# Patient Record
Sex: MALE | Race: WHITE | NOT HISPANIC OR LATINO | URBAN - NONMETROPOLITAN AREA
[De-identification: names, ages, dates, MRNs, and addresses within clinical notes are randomized per-mention and may not be internally consistent; named-entity substitution may affect disease eponyms.]

---

## 2018-12-21 RX ORDER — ALBUTEROL SULFATE 90 UG/1
2 AEROSOL, METERED RESPIRATORY (INHALATION) EVERY 4 HOURS PRN
Qty: 3 INHALER | Refills: 3 | Status: SHIPPED | OUTPATIENT
Start: 2018-12-21

## 2018-12-21 RX ORDER — TIOTROPIUM BROMIDE 18 UG/1
CAPSULE ORAL; RESPIRATORY (INHALATION)
Qty: 90 CAPSULE | Refills: 3 | Status: SHIPPED | OUTPATIENT
Start: 2018-12-21 | End: 2020-01-30 | Stop reason: ALTCHOICE

## 2018-12-21 NOTE — TELEPHONE ENCOUNTER
Patient needs a refill on his spiriva.I was going to edit the refills, but it wants a signature. Not sure if it would be valid with my signature.

## 2019-10-31 PROBLEM — R13.12 OROPHARYNGEAL DYSPHAGIA: Status: ACTIVE | Noted: 2019-10-31

## 2019-10-31 PROBLEM — E11.59 TYPE 2 DIABETES MELLITUS WITH CIRCULATORY DISORDER, WITHOUT LONG-TERM CURRENT USE OF INSULIN: Status: ACTIVE | Noted: 2019-10-31

## 2019-10-31 PROBLEM — J98.4 RESTRICTIVE LUNG DISEASE: Status: ACTIVE | Noted: 2019-10-31

## 2019-10-31 PROBLEM — I10 ESSENTIAL HYPERTENSION: Status: ACTIVE | Noted: 2019-10-31

## 2019-10-31 PROBLEM — J43.2 CENTRILOBULAR EMPHYSEMA (HCC): Status: ACTIVE | Noted: 2019-10-31

## 2019-10-31 PROBLEM — G47.33 OBSTRUCTIVE SLEEP APNEA: Status: ACTIVE | Noted: 2019-10-31

## 2019-10-31 PROBLEM — Z87.891 PERSONAL HISTORY OF NICOTINE DEPENDENCE: Status: ACTIVE | Noted: 2019-10-31

## 2019-11-07 ENCOUNTER — OFFICE VISIT (OUTPATIENT)
Dept: PULMONOLOGY | Facility: CLINIC | Age: 59
End: 2019-11-07

## 2019-11-07 VITALS
WEIGHT: 196 LBS | HEART RATE: 84 BPM | DIASTOLIC BLOOD PRESSURE: 80 MMHG | SYSTOLIC BLOOD PRESSURE: 142 MMHG | OXYGEN SATURATION: 98 % | HEIGHT: 70 IN | BODY MASS INDEX: 28.06 KG/M2

## 2019-11-07 DIAGNOSIS — J43.2 CENTRILOBULAR EMPHYSEMA (HCC): ICD-10-CM

## 2019-11-07 DIAGNOSIS — J98.4 RESTRICTIVE LUNG DISEASE: Primary | ICD-10-CM

## 2019-11-07 DIAGNOSIS — Z87.891 PERSONAL HISTORY OF NICOTINE DEPENDENCE: ICD-10-CM

## 2019-11-07 PROCEDURE — 94010 BREATHING CAPACITY TEST: CPT | Performed by: NURSE PRACTITIONER

## 2019-11-07 PROCEDURE — 99214 OFFICE O/P EST MOD 30 MIN: CPT | Performed by: NURSE PRACTITIONER

## 2019-11-07 RX ORDER — CARVEDILOL 12.5 MG/1
TABLET ORAL
Refills: 3 | COMMUNITY
Start: 2019-08-26

## 2019-11-07 RX ORDER — LOSARTAN POTASSIUM AND HYDROCHLOROTHIAZIDE 12.5; 1 MG/1; MG/1
TABLET ORAL
Refills: 0 | COMMUNITY
Start: 2019-08-24

## 2019-11-07 RX ORDER — ALBUTEROL SULFATE 90 UG/1
2 AEROSOL, METERED RESPIRATORY (INHALATION) EVERY 4 HOURS PRN
Qty: 1 INHALER | Refills: 2 | Status: SHIPPED | OUTPATIENT
Start: 2019-11-07 | End: 2019-12-07

## 2019-11-07 RX ORDER — AMLODIPINE BESYLATE 5 MG/1
TABLET ORAL
Refills: 2 | COMMUNITY
Start: 2019-08-26

## 2019-11-07 NOTE — PATIENT INSTRUCTIONS

## 2019-11-07 NOTE — PROCEDURES
Pulmonary Function Test  Performed by: Negrita Hernandez APRN  Authorized by: Negrita Hernandez APRN      Pre Drug    FVC: 75%   FEV1: 73%   FEF 25-75%: 67%   FEV1/FVC: 77.61%

## 2020-01-24 DIAGNOSIS — J98.4 RESTRICTIVE LUNG DISEASE: ICD-10-CM

## 2020-01-24 NOTE — TELEPHONE ENCOUNTER
Patient changed insurance companies and needs a new script sent to [facility].  Refills approved and sent to pharmacy.

## 2020-01-30 ENCOUNTER — TELEPHONE (OUTPATIENT)
Dept: PULMONOLOGY | Facility: CLINIC | Age: 60
End: 2020-01-30

## 2020-01-30 NOTE — TELEPHONE ENCOUNTER
Received the denial letter for the Spiriva.  Patient is to use incruse first or if the patient has a low inspiratory flow rate and can't use a dry powder.  Does this patient have asthma?  Do you want to switch him to incruse?

## 2021-01-12 PROBLEM — E11.59 TYPE 2 DIABETES MELLITUS WITH CIRCULATORY DISORDER, WITHOUT LONG-TERM CURRENT USE OF INSULIN: Chronic | Status: ACTIVE | Noted: 2019-10-31

## 2021-01-12 PROBLEM — G47.33 OBSTRUCTIVE SLEEP APNEA: Chronic | Status: ACTIVE | Noted: 2019-10-31

## 2021-01-12 PROBLEM — Z87.891 PERSONAL HISTORY OF NICOTINE DEPENDENCE: Chronic | Status: ACTIVE | Noted: 2019-10-31

## 2021-01-12 PROBLEM — J43.2 CENTRILOBULAR EMPHYSEMA (HCC): Chronic | Status: ACTIVE | Noted: 2019-10-31

## 2021-01-12 PROBLEM — J98.4 RESTRICTIVE LUNG DISEASE: Chronic | Status: ACTIVE | Noted: 2019-10-31

## 2021-01-12 PROBLEM — I10 ESSENTIAL HYPERTENSION: Chronic | Status: ACTIVE | Noted: 2019-10-31

## 2021-01-12 NOTE — PROGRESS NOTES
"Chief Complaint  restrictive lung disease and Sleep Apnea    Subjective    History of Present Illness      Prosper Hussein presents to [facility] for:    Management of her restrictive lung disease and emphysema.  He gets short of breath.  He takes Spiriva which helps.  He has Ventolin he can use when needed although he does not typically require it.  He reports he was diagnosed with Covid 4 weeks ago.  He presented to his primary care office weak, dizzy, hypoxic and generally ill.  He was sent to the emergency room for Regeneron infusion.  Within 24 hours his symptoms were essentially resolved.  He has had 2 utilize his Ventolin some since the diagnosis but not excessive.  He is not used it in the last several days.  He has not been requiring the Tessalon Perles.  He is overweight.  He used to smoke but no longer does.        Objective   Vital Signs:   /80   Pulse 85   Temp 98.2 °F (36.8 °C)   Ht 177.8 cm (70\")   Wt 88.2 kg (194 lb 6.4 oz)   SpO2 100%   BMI 27.89 kg/m²     Physical Exam  Constitutional:       Appearance: He is overweight.      Interventions: Face mask in place.   Eyes:      Comments: glasses   Cardiovascular:      Rate and Rhythm: Normal rate and regular rhythm.      Heart sounds: No murmur.   Pulmonary:      Effort: Pulmonary effort is normal.      Breath sounds: Normal breath sounds.   Musculoskeletal:      Right lower leg: No edema.      Left lower leg: No edema.   Neurological:      Mental Status: He is alert and oriented to person, place, and time.        Result Review :{ Labs  Result Review  Imaging  Med Tab  Media     PFT Values        Some values may be hidden. Unless noted otherwise, only the newest values recorded on each date are displayed.         Old Values PFT Results 11/7/19   No data to display.      Pre Drug PFT Results 11/7/19   FVC 75   FEV1 73   FEF 25-75% 67   FEV1/FVC 77.61      Post Drug PFT Results 11/7/19   No data to " display.      Other Tests PFT Results 11/7/19   No data to display.           My interpretation of the PFT: None for this visit    Results for orders placed in visit on 11/07/19   Pulmonary Function Test    Narrative Gui Segundo, MA     11/7/2019  3:48 PM  Pulmonary Function Test  Performed by: Negrita Hernandez APRN  Authorized by: Negrita Hernandez APRN      Pre Drug    FVC: 75%   FEV1: 73%   FEF 25-75%: 67%   FEV1/FVC: 77.61%       My interpretation of imaging:  None for today    My interpretation of labs: none for today      Assessment and Plan      Problem List Items Addressed This Visit        Other    Restrictive lung disease - Primary (Chronic)    Relevant Medications    Spiriva Respimat 2.5 MCG/ACT aerosol solution inhaler    benzonatate (TESSALON) 200 MG capsule    BMI 28.0-28.9,adult (Chronic)    Personal history of nicotine dependence (Chronic)    Centrilobular emphysema (CMS/HCC) (Chronic)    Relevant Medications    Spiriva Respimat 2.5 MCG/ACT aerosol solution inhaler    benzonatate (TESSALON) 200 MG capsule      Other Visit Diagnoses     COVID-19 virus detected              Patient's Body mass index is 27.89 kg/m². BMI is above normal parameters. Recommendations include: educational material.      He typically sees us on an annual basis however he was recently diagnosed with Covid.  Symptoms improved significantly with Regeneron infusion.  He continues to have occasional cough, fatigue and shortness of breath.  Recommend he return in a few months with updated PFTs for continued surveillance.  I will order these.  He continues to do well with the Spiriva and will keep taking it.  He has used his Ventolin few times since his Covid diagnosis and tolerated well.  He can continue to use that on an as-needed basis for his shortness of breath and cough.  He also has Tessalon Perles he can use for the cough.  He continues to not smoke.  He is overweight.  Educational material provided in his after visit  summary.  He is aware he can reach out if symptoms are worsening or not improving otherwise we will see him back with updated PFTs in 6 months.    Negrita Hernandez, APRN  1/18/2021  09:48 CST    Follow Up   Return in about 6 months (around 7/18/2021) for FVL.    Patient was given instructions and counseling regarding his condition or for health maintenance advice. Please see specific information pulled into the AVS if appropriate.

## 2021-01-18 ENCOUNTER — OFFICE VISIT (OUTPATIENT)
Dept: PULMONOLOGY | Facility: CLINIC | Age: 61
End: 2021-01-18

## 2021-01-18 VITALS
DIASTOLIC BLOOD PRESSURE: 80 MMHG | BODY MASS INDEX: 27.83 KG/M2 | HEART RATE: 85 BPM | TEMPERATURE: 98.2 F | WEIGHT: 194.4 LBS | HEIGHT: 70 IN | SYSTOLIC BLOOD PRESSURE: 126 MMHG | OXYGEN SATURATION: 100 %

## 2021-01-18 DIAGNOSIS — U07.1 COVID-19 VIRUS DETECTED: ICD-10-CM

## 2021-01-18 DIAGNOSIS — J98.4 RESTRICTIVE LUNG DISEASE: Primary | Chronic | ICD-10-CM

## 2021-01-18 DIAGNOSIS — J43.2 CENTRILOBULAR EMPHYSEMA (HCC): Chronic | ICD-10-CM

## 2021-01-18 DIAGNOSIS — Z87.891 PERSONAL HISTORY OF NICOTINE DEPENDENCE: Chronic | ICD-10-CM

## 2021-01-18 PROCEDURE — 99214 OFFICE O/P EST MOD 30 MIN: CPT | Performed by: NURSE PRACTITIONER

## 2021-01-18 RX ORDER — TIOTROPIUM BROMIDE INHALATION SPRAY 3.12 UG/1
1 SPRAY, METERED RESPIRATORY (INHALATION) 2 TIMES DAILY
COMMUNITY
Start: 2020-10-14 | End: 2021-02-26 | Stop reason: SDUPTHER

## 2021-01-18 RX ORDER — BENZONATATE 200 MG/1
200 CAPSULE ORAL EVERY 12 HOURS
COMMUNITY
Start: 2020-11-25 | End: 2021-12-16 | Stop reason: SDUPTHER

## 2021-01-18 RX ORDER — ASPIRIN 81 MG/1
81 TABLET, DELAYED RELEASE ORAL DAILY
COMMUNITY

## 2021-01-18 NOTE — PATIENT INSTRUCTIONS
"BMI for Adults  What is BMI?  Body mass index (BMI) is a number that is calculated from a person's weight and height. BMI can help estimate how much of a person's weight is composed of fat. BMI does not measure body fat directly. Rather, it is an alternative to procedures that directly measure body fat, which can be difficult and expensive.  BMI can help identify people who may be at higher risk for certain medical problems.  What are BMI measurements used for?  BMI is used as a screening tool to identify possible weight problems. It helps determine whether a person is obese, overweight, a healthy weight, or underweight.  BMI is useful for:  · Identifying a weight problem that may be related to a medical condition or may increase the risk for medical problems.  · Promoting changes, such as changes in diet and exercise, to help reach a healthy weight. BMI screening can be repeated to see if these changes are working.  How is BMI calculated?  BMI involves measuring your weight in relation to your height. Both height and weight are measured, and the BMI is calculated from those numbers. This can be done either in English (U.S[place] or metric measurements. Note that charts and online BMI calculators are available to help you find your BMI quickly and easily without having to do these calculations yourself.  To calculate your BMI in English (U.S[place] measurements:    1. Measure your weight in pounds (lb).  2. Multiply the number of pounds by 703.  ? For example, for a person who weighs 180 lb, multiply that number by 703, which equals 126,540.  3. Measure your height in inches. Then multiply that number by itself to get a measurement called \"inches squared.\"  ? For example, for a person who is 70 inches tall, the \"inches squared\" measurement is 70 inches x 70 inches, which equals 4,900 inches squared.  4. Divide the total from step 2 (number of lb x 703) by the total from step 3 (inches squared): 126,540 ÷ 4,900 = 25.8. This is " "your BMI.  To calculate your BMI in metric measurements:  1. Measure your weight in kilograms (kg).  2. Measure your height in meters (m). Then multiply that number by itself to get a measurement called \"meters squared.\"  ? For example, for a person who is 1.75 m tall, the \"meters squared\" measurement is 1.75 m x 1.75 m, which is equal to 3.1 meters squared.  3. Divide the number of kilograms (your weight) by the meters squared number. In this example: 70 ÷ 3.1 = 22.6. This is your BMI.  What do the results mean?  BMI charts are used to identify whether you are underweight, normal weight, overweight, or obese. The following guidelines will be used:  · Underweight: BMI less than 18.5.  · Normal weight: BMI between 18.5 and 24.9.  · Overweight: BMI between 25 and 29.9.  · Obese: BMI of 30 or above.  Keep these notes in mind:  · Weight includes both fat and muscle, so someone with a muscular build, such as an athlete, may have a BMI that is higher than 24.9. In cases like these, BMI is not an accurate measure of body fat.  · To determine if excess body fat is the cause of a BMI of 25 or higher, further assessments may need to be done by a health care provider.  · BMI is usually interpreted in the same way for men and women.  Where to find more information  For more information about BMI, including tools to quickly calculate your BMI, go to these websites:  · Centers for Disease Control and Prevention: www.cdc.gov  · American Heart Association: www.heart.org  · National Heart, Lung, and Blood [place]: www.nhlbi.nih.gov  Summary  · Body mass index (BMI) is a number that is calculated from a person's weight and height.  · BMI may help estimate how much of a person's weight is composed of fat. BMI can help identify those who may be at higher risk for certain medical problems.  · BMI can be measured using English measurements or metric measurements.  · BMI charts are used to identify whether you are underweight, normal " weight, overweight, or obese.  This information is not intended to replace advice given to you by your health care provider. Make sure you discuss any questions you have with your health care provider.  Document Revised: 09/09/2020 Document Reviewed: 07/17/2020  Elsevier Patient Education © 2020 Elsevier Inc.

## 2021-02-26 RX ORDER — TIOTROPIUM BROMIDE INHALATION SPRAY 3.12 UG/1
2 SPRAY, METERED RESPIRATORY (INHALATION)
Qty: 1 EACH | Refills: 11 | Status: SHIPPED | OUTPATIENT
Start: 2021-02-26 | End: 2021-11-02

## 2021-02-26 NOTE — TELEPHONE ENCOUNTER
Patient called stating that he doesn't have any Spiriva at [facility] [place]

## 2021-07-06 PROBLEM — J92.9 PLEURAL THICKENING: Status: ACTIVE | Noted: 2021-07-06

## 2021-07-06 PROBLEM — J92.9 PLEURAL THICKENING: Chronic | Status: ACTIVE | Noted: 2021-07-06

## 2021-10-30 ENCOUNTER — LAB (OUTPATIENT)
Dept: LAB | Facility: HOSPITAL | Age: 61
End: 2021-10-30

## 2021-10-30 DIAGNOSIS — Z01.812 ENCOUNTER FOR PREOPERATIVE SCREENING LABORATORY TESTING FOR COVID-19 VIRUS: ICD-10-CM

## 2021-10-30 DIAGNOSIS — Z20.822 ENCOUNTER FOR PREOPERATIVE SCREENING LABORATORY TESTING FOR COVID-19 VIRUS: ICD-10-CM

## 2021-10-30 LAB — SARS-COV-2 ORF1AB RESP QL NAA+PROBE: NOT DETECTED

## 2021-10-30 PROCEDURE — C9803 HOPD COVID-19 SPEC COLLECT: HCPCS

## 2021-10-30 PROCEDURE — U0004 COV-19 TEST NON-CDC HGH THRU: HCPCS

## 2021-11-02 ENCOUNTER — OFFICE VISIT (OUTPATIENT)
Dept: PULMONOLOGY | Facility: CLINIC | Age: 61
End: 2021-11-02

## 2021-11-02 VITALS
WEIGHT: 196.8 LBS | SYSTOLIC BLOOD PRESSURE: 140 MMHG | OXYGEN SATURATION: 98 % | HEIGHT: 70 IN | DIASTOLIC BLOOD PRESSURE: 86 MMHG | HEART RATE: 82 BPM | BODY MASS INDEX: 28.18 KG/M2

## 2021-11-02 DIAGNOSIS — J43.2 CENTRILOBULAR EMPHYSEMA (HCC): Primary | Chronic | ICD-10-CM

## 2021-11-02 DIAGNOSIS — Z20.822 ENCOUNTER FOR PREOPERATIVE SCREENING LABORATORY TESTING FOR COVID-19 VIRUS: ICD-10-CM

## 2021-11-02 DIAGNOSIS — Z23 NEED FOR INFLUENZA VACCINATION: ICD-10-CM

## 2021-11-02 DIAGNOSIS — Z01.812 ENCOUNTER FOR PREOPERATIVE SCREENING LABORATORY TESTING FOR COVID-19 VIRUS: ICD-10-CM

## 2021-11-02 DIAGNOSIS — J92.9 PLEURAL THICKENING: Chronic | ICD-10-CM

## 2021-11-02 DIAGNOSIS — J98.4 RESTRICTIVE LUNG DISEASE: Chronic | ICD-10-CM

## 2021-11-02 DIAGNOSIS — Z87.891 PERSONAL HISTORY OF NICOTINE DEPENDENCE: Chronic | ICD-10-CM

## 2021-11-02 PROCEDURE — 94010 BREATHING CAPACITY TEST: CPT | Performed by: NURSE PRACTITIONER

## 2021-11-02 PROCEDURE — 90686 IIV4 VACC NO PRSV 0.5 ML IM: CPT | Performed by: NURSE PRACTITIONER

## 2021-11-02 PROCEDURE — 90471 IMMUNIZATION ADMIN: CPT | Performed by: NURSE PRACTITIONER

## 2021-11-02 PROCEDURE — 99214 OFFICE O/P EST MOD 30 MIN: CPT | Performed by: NURSE PRACTITIONER

## 2021-11-02 RX ORDER — RESVER/WINE/BFL/GRPSD/PC/C/POM 200MG-60MG
1 CAPSULE ORAL DAILY
COMMUNITY
Start: 2021-08-17

## 2021-11-02 NOTE — PROCEDURES
Pulmonary Function Test  Performed by: Ayla Orr, RRT  Authorized by: Negrita Hernandez APRN      Pre Drug % Predicted    FVC: 75%   FEV1: 69%   FEF 25-75%: 48%   FEV1/FVC: 73.59%    Interpretation   Spirometry   Spirometry shows moderate restriction. There is reduced midflow suggesting small airway/airflow obstruction.

## 2021-11-02 NOTE — PATIENT INSTRUCTIONS
"BMI for Adults  What is BMI?  Body mass index (BMI) is a number that is calculated from a person's weight and height. BMI can help estimate how much of a person's weight is composed of fat. BMI does not measure body fat directly. Rather, it is an alternative to procedures that directly measure body fat, which can be difficult and expensive.  BMI can help identify people who may be at higher risk for certain medical problems.  What are BMI measurements used for?  BMI is used as a screening tool to identify possible weight problems. It helps determine whether a person is obese, overweight, a healthy weight, or underweight.  BMI is useful for:  · Identifying a weight problem that may be related to a medical condition or may increase the risk for medical problems.  · Promoting changes, such as changes in diet and exercise, to help reach a healthy weight. BMI screening can be repeated to see if these changes are working.  How is BMI calculated?  BMI involves measuring your weight in relation to your height. Both height and weight are measured, and the BMI is calculated from those numbers. This can be done either in English (U.S[place] or metric measurements. Note that charts and online BMI calculators are available to help you find your BMI quickly and easily without having to do these calculations yourself.  To calculate your BMI in English (U.S.) measurements:    1. Measure your weight in pounds (lb).  2. Multiply the number of pounds by 703.  ? For example, for a person who weighs 180 lb, multiply that number by 703, which equals 126,540.  3. Measure your height in inches. Then multiply that number by itself to get a measurement called \"inches squared.\"  ? For example, for a person who is 70 inches tall, the \"inches squared\" measurement is 70 inches x 70 inches, which equals 4,900 inches squared.  4. Divide the total from step 2 (number of lb x 703) by the total from step 3 (inches squared): 126,540 ÷ 4,900 = 25.8. This is " "your BMI.    To calculate your BMI in metric measurements:  1. Measure your weight in kilograms (kg).  2. Measure your height in meters (m). Then multiply that number by itself to get a measurement called \"meters squared.\"  ? For example, for a person who is 1.75 m tall, the \"meters squared\" measurement is 1.75 m x 1.75 m, which is equal to 3.1 meters squared.  3. Divide the number of kilograms (your weight) by the meters squared number. In this example: 70 ÷ 3.1 = 22.6. This is your BMI.  What do the results mean?  BMI charts are used to identify whether you are underweight, normal weight, overweight, or obese. The following guidelines will be used:  · Underweight: BMI less than 18.5.  · Normal weight: BMI between 18.5 and 24.9.  · Overweight: BMI between 25 and 29.9.  · Obese: BMI of 30 or above.  Keep these notes in mind:  · Weight includes both fat and muscle, so someone with a muscular build, such as an athlete, may have a BMI that is higher than 24.9. In cases like these, BMI is not an accurate measure of body fat.  · To determine if excess body fat is the cause of a BMI of 25 or higher, further assessments may need to be done by a health care provider.  · BMI is usually interpreted in the same way for men and women.  Where to find more information  For more information about BMI, including tools to quickly calculate your BMI, go to these websites:  · Centers for Disease Control and Prevention: www.cdc.gov  · American Heart Association: www.heart.org  · National Heart, Lung, and Blood [place]: www.nhlbi.nih.gov  Summary  · Body mass index (BMI) is a number that is calculated from a person's weight and height.  · BMI may help estimate how much of a person's weight is composed of fat. BMI can help identify those who may be at higher risk for certain medical problems.  · BMI can be measured using English measurements or metric measurements.  · BMI charts are used to identify whether you are underweight, normal " weight, overweight, or obese.  This information is not intended to replace advice given to you by your health care provider. Make sure you discuss any questions you have with your health care provider.  Document Revised: 09/09/2020 Document Reviewed: 07/17/2020  Elsevier Patient Education © 2021 Elsevier Inc.

## 2021-12-16 DIAGNOSIS — J43.2 CENTRILOBULAR EMPHYSEMA (HCC): Primary | ICD-10-CM

## 2021-12-16 RX ORDER — BENZONATATE 200 MG/1
200 CAPSULE ORAL EVERY 12 HOURS
Qty: 60 CAPSULE | Refills: 3 | Status: SHIPPED | OUTPATIENT
Start: 2021-12-16 | End: 2022-10-13 | Stop reason: SDUPTHER

## 2021-12-16 NOTE — TELEPHONE ENCOUNTER
Patient left a voicemail requesting refills on Tessalon perles. He also said the Incruse is not any cheaper.  I have tried to call him this morning to get more information on the Incruse and he does not have voicemail. I will continue trying to reach him.     Rx Refill Note  Requested Prescriptions     Pending Prescriptions Disp Refills   • benzonatate (TESSALON) 200 MG capsule       Sig: Take 1 capsule by mouth Every 12 (Twelve) Hours.      Last office visit with prescribing clinician: 11/2/2021      Next office visit with prescribing clinician: 5/5/2022            Gui Segundo CMA  12/16/21, 09:09 CST

## 2022-05-05 ENCOUNTER — OFFICE VISIT (OUTPATIENT)
Dept: PULMONOLOGY | Facility: CLINIC | Age: 62
End: 2022-05-05

## 2022-05-05 VITALS
DIASTOLIC BLOOD PRESSURE: 72 MMHG | HEIGHT: 70 IN | HEART RATE: 84 BPM | SYSTOLIC BLOOD PRESSURE: 140 MMHG | BODY MASS INDEX: 28.92 KG/M2 | OXYGEN SATURATION: 97 % | WEIGHT: 202 LBS

## 2022-05-05 DIAGNOSIS — J92.9 PLEURAL THICKENING: Chronic | ICD-10-CM

## 2022-05-05 DIAGNOSIS — J43.2 CENTRILOBULAR EMPHYSEMA: Chronic | ICD-10-CM

## 2022-05-05 DIAGNOSIS — J98.4 RESTRICTIVE LUNG DISEASE: Primary | Chronic | ICD-10-CM

## 2022-05-05 DIAGNOSIS — Z87.891 PERSONAL HISTORY OF NICOTINE DEPENDENCE: Chronic | ICD-10-CM

## 2022-05-05 PROCEDURE — 99213 OFFICE O/P EST LOW 20 MIN: CPT | Performed by: NURSE PRACTITIONER

## 2022-05-05 RX ORDER — TIOTROPIUM BROMIDE INHALATION SPRAY 3.12 UG/1
2 SPRAY, METERED RESPIRATORY (INHALATION)
Qty: 3 EACH | Refills: 3 | Status: SHIPPED | OUTPATIENT
Start: 2022-05-05 | End: 2022-11-14

## 2022-10-13 DIAGNOSIS — J43.2 CENTRILOBULAR EMPHYSEMA: ICD-10-CM

## 2022-10-13 RX ORDER — BENZONATATE 200 MG/1
200 CAPSULE ORAL EVERY 12 HOURS
Qty: 60 CAPSULE | Refills: 3 | Status: SHIPPED | OUTPATIENT
Start: 2022-10-13

## 2022-10-13 NOTE — TELEPHONE ENCOUNTER
Rx Refill Note  Requested Prescriptions     Pending Prescriptions Disp Refills   • benzonatate (TESSALON) 200 MG capsule 60 capsule 3     Sig: Take 1 capsule by mouth Every 12 (Twelve) Hours.      Last office visit with prescribing clinician: 5/5/2022      Next office visit with prescribing clinician: 11/14/2022            Vivian Monique MA  10/13/22, 10:46 CDT

## 2022-11-14 ENCOUNTER — PROCEDURE VISIT (OUTPATIENT)
Dept: PULMONOLOGY | Facility: CLINIC | Age: 62
End: 2022-11-14

## 2022-11-14 ENCOUNTER — OFFICE VISIT (OUTPATIENT)
Dept: PULMONOLOGY | Facility: CLINIC | Age: 62
End: 2022-11-14

## 2022-11-14 VITALS
BODY MASS INDEX: 29.69 KG/M2 | HEIGHT: 70 IN | WEIGHT: 207.4 LBS | OXYGEN SATURATION: 96 % | SYSTOLIC BLOOD PRESSURE: 146 MMHG | DIASTOLIC BLOOD PRESSURE: 82 MMHG | HEART RATE: 93 BPM

## 2022-11-14 DIAGNOSIS — J43.2 CENTRILOBULAR EMPHYSEMA: Primary | ICD-10-CM

## 2022-11-14 DIAGNOSIS — J98.4 RESTRICTIVE LUNG DISEASE: Chronic | ICD-10-CM

## 2022-11-14 DIAGNOSIS — J92.9 PLEURAL THICKENING: Primary | Chronic | ICD-10-CM

## 2022-11-14 DIAGNOSIS — J43.2 CENTRILOBULAR EMPHYSEMA: Chronic | ICD-10-CM

## 2022-11-14 DIAGNOSIS — G47.33 OBSTRUCTIVE SLEEP APNEA: Chronic | ICD-10-CM

## 2022-11-14 DIAGNOSIS — Z87.891 PERSONAL HISTORY OF NICOTINE DEPENDENCE: Chronic | ICD-10-CM

## 2022-11-14 PROCEDURE — 94010 BREATHING CAPACITY TEST: CPT | Performed by: NURSE PRACTITIONER

## 2022-11-14 PROCEDURE — 94729 DIFFUSING CAPACITY: CPT | Performed by: NURSE PRACTITIONER

## 2022-11-14 PROCEDURE — 99214 OFFICE O/P EST MOD 30 MIN: CPT | Performed by: NURSE PRACTITIONER

## 2022-11-14 NOTE — PROCEDURES
Pulmonary Function Test  Performed by: Ayla Orr, RRT  Authorized by: Negrita Hernandez APRN      Pre Drug % Predicted    FVC: 66%   FEV1: 66%   FEF 25-75%: 63%   FEV1/FVC: 76%   DLCO: 77%   D/VAsb: 110%    Interpretation   Spirometry   Spirometry shows moderate restriction. There is reduced midflow suggesting small airway/airflow obstruction.   Diffusion Capacity  The patient's diffusion capacity is mildly reduced.  Diffusion capacity is normal when corrected for alveolar volume.

## 2023-04-26 NOTE — PROGRESS NOTES
Chief Complaint  Pleural thickening    Subjective    History of Present Illness     Prosper Hussein presents to [facility] for:    History of Present Illness  Management of her restrictive lung disease, pleural thickening and emphysema.  He is overweight.  He quit smoking in 1999.  He gets short of breath.  He takes Spiriva which helps.  He is needing refills.  He has Ventolin he can use when needed although he does not typically require it.  He does not need refills.  He takes tessalon perles as needed for cough.  He has not required them since his last office visit.  He has sleep apnea.  He is on Pap therapy.  He is compliant with this and benefiting from it.  He is awaiting an appoint with cardiology for his blood pressure and an abnormal CT coronary scan.     Prior to Admission medications    Medication Sig Start Date End Date Taking? Authorizing Provider   albuterol sulfate HFA (VENTOLIN HFA) 108 (90 Base) MCG/ACT inhaler Inhale 2 puffs Every 4 (Four) Hours As Needed for Wheezing. 12/21/18   Negrita Hernandez APRN   amLODIPine (NORVASC) 5 MG tablet TK 1 T PO ONCE A DAY 8/26/19   Roslyn Arnold MD   aspirin 81 MG EC tablet Take 81 mg by mouth Daily.    ProviderRoslyn MD   benzonatate (TESSALON) 200 MG capsule Take 1 capsule by mouth Every 12 (Twelve) Hours. 10/13/22   Negrita Hernandez APRN   carvedilol (COREG) 12.5 MG tablet TK 1 T PO QAM AND 2 TS QPM 8/26/19   Roslyn Arnold MD   D-5000 125 MCG (5000 UT) tablet Take 1 tablet by mouth Daily. 8/17/21   Roslyn Arnold MD   losartan-hydrochlorothiazide (HYZAAR) 100-12.5 MG per tablet TK 1 T PO ONCE A DAY GEF HYZAAR 8/24/19   Roslyn Arnold MD   metFORMIN (GLUCOPHAGE) 500 MG tablet TK 1 T PO  BID WC 8/26/19   Roslyn Arnold MD   Spiriva Respimat 2.5 MCG/ACT aerosol solution inhaler Inhale 2 puffs Daily for 30 days. 2/26/21 11/2/21  Negrita Hernandez APRN   tiotropium bromide  "monohydrate (Spiriva Respimat) 2.5 MCG/ACT aerosol solution inhaler Inhale 2 puffs Daily for 90 days. 22  Negrita Hernandez APRN       Social History     Socioeconomic History    Marital status:    Tobacco Use    Smoking status: Former     Packs/day: 1.00     Years: 20.00     Pack years: 20.00     Types: Cigarettes     Quit date:      Years since quittin.4     Passive exposure: Past    Smokeless tobacco: Never   Substance and Sexual Activity    Alcohol use: Yes    Drug use: No    Sexual activity: Defer       Objective   Vital Signs:   /82   Pulse 71   Ht 177.8 cm (70\")   Wt 88.9 kg (196 lb)   SpO2 97% Comment: RA  BMI 28.12 kg/m²     Physical Exam  Eyes:      Comments: Glasses    Cardiovascular:      Rate and Rhythm: Normal rate and regular rhythm.      Heart sounds: No murmur heard.  Pulmonary:      Effort: Pulmonary effort is normal.      Breath sounds: Normal breath sounds.   Musculoskeletal:      Right lower leg: No edema.      Left lower leg: No edema.   Neurological:      Mental Status: He is alert and oriented to person, place, and time.      Result Review :    PFT Values          2021    15:45 2022    09:15   Pre Drug PFT Results   FVC 75 66   FEV1 69 66   FEF 25-75% 48 63   FEV1/FVC 73.59 76   Other Tests PFT Results   DLCO  77   D/VAsb  110     My interpretation of the PFT: none    Results for orders placed in visit on 22    Pulmonary Function Test    Narrative  Pulmonary Function Test  Performed by: Ayla Orr, RRT  Authorized by: Negrita Hernandez APRN    Pre Drug % Predicted  FVC: 66%  FEV1: 66%  FEF 25-75%: 63%  FEV1/FVC: 76%  DLCO: 77%  D/VAsb: 110%    Interpretation  Spirometry  Spirometry shows moderate restriction. There is reduced midflow suggesting small airway/airflow obstruction.  Diffusion Capacity  The patient's diffusion capacity is mildly reduced.  Diffusion capacity is normal when corrected for alveolar " volume.      Results for orders placed in visit on 11/02/21    Pulmonary Function Test    Narrative  Pulmonary Function Test  Performed by: Ayla Orr RRT  Authorized by: Negrita Hernandez APRN    Pre Drug % Predicted  FVC: 75%  FEV1: 69%  FEF 25-75%: 48%  FEV1/FVC: 73.59%    Interpretation  Spirometry  Spirometry shows moderate restriction. There is reduced midflow suggesting small airway/airflow obstruction.      Results for orders placed in visit on 11/07/19    Pulmonary Function Test    Narrative  Pulmonary Function Test  Performed by: Negrita Hernandez APRN  Authorized by: Negrita Hernandez APRN    Pre Drug  FVC: 75%  FEV1: 73%  FEF 25-75%: 67%  FEV1/FVC: 77.61%    My interpretation of imaging:  none    My interpretation of labs: none      Assessment and Plan     Diagnoses and all orders for this visit:    1. Restrictive lung disease (Primary)  Comments:  Continue to avoid smoking.  PFTs with follow-up.  Continue Spiriva.  Refill sent to pharmacy.  Use Ventolin as needed    2. Pleural thickening  Comments:  PFTs with follow-up    3. Obstructive sleep apnea  Comments:  Continue PAP therapy.  He is compliant with this and benefiting from it    4. Personal history of nicotine dependence  Comments:  Does not meet criteria for low-dose lung cancer screening    5. Encounter for immunization  Comments:  Preventative health maintenance provided.  Pneumonia shot given.  No unwanted side effects noted while monitoring him  Orders:  -     Pneumococcal Conjugate Vaccine 20-Valent (PCV20)    6. Centrilobular emphysema  Comments:  Continue to avoid smoking.  PFTs with follow-up.  Continue Spiriva.  Refill sent to pharmacy.  Use Ventolin as needed  Orders:  -     tiotropium bromide monohydrate (Spiriva Respimat) 2.5 MCG/ACT aerosol solution inhaler; Inhale 2 puffs Daily for 90 days.  Dispense: 3 each; Refill: 3        Body mass index is 28.12 kg/m².      Charting time spent: 4  Bedside time spent: Allegra RANDOLPH  KAROL Hernandez  5/24/2023  11:17 CDT    Follow Up   Return in about 6 months (around 11/24/2023) for FVL.    Patient was given instructions and counseling regarding his condition or for health maintenance advice. Please see specific information pulled into the AVS if appropriate.

## 2023-05-24 ENCOUNTER — OFFICE VISIT (OUTPATIENT)
Dept: PULMONOLOGY | Facility: CLINIC | Age: 63
End: 2023-05-24
Payer: COMMERCIAL

## 2023-05-24 VITALS
SYSTOLIC BLOOD PRESSURE: 136 MMHG | WEIGHT: 196 LBS | DIASTOLIC BLOOD PRESSURE: 82 MMHG | HEIGHT: 70 IN | BODY MASS INDEX: 28.06 KG/M2 | OXYGEN SATURATION: 97 % | HEART RATE: 71 BPM

## 2023-05-24 DIAGNOSIS — J92.9 PLEURAL THICKENING: Chronic | ICD-10-CM

## 2023-05-24 DIAGNOSIS — G47.33 OBSTRUCTIVE SLEEP APNEA: Chronic | ICD-10-CM

## 2023-05-24 DIAGNOSIS — J98.4 RESTRICTIVE LUNG DISEASE: Primary | Chronic | ICD-10-CM

## 2023-05-24 DIAGNOSIS — Z23 ENCOUNTER FOR IMMUNIZATION: ICD-10-CM

## 2023-05-24 DIAGNOSIS — J43.2 CENTRILOBULAR EMPHYSEMA: Chronic | ICD-10-CM

## 2023-05-24 DIAGNOSIS — Z87.891 PERSONAL HISTORY OF NICOTINE DEPENDENCE: Chronic | ICD-10-CM

## 2023-05-24 RX ORDER — TIOTROPIUM BROMIDE INHALATION SPRAY 3.12 UG/1
2 SPRAY, METERED RESPIRATORY (INHALATION)
Qty: 3 EACH | Refills: 3 | Status: SHIPPED | OUTPATIENT
Start: 2023-05-24 | End: 2023-08-22

## 2023-10-30 NOTE — PROGRESS NOTES
Chief Complaint  Restrictive lung disease    Subjective    History of Present Illness {  Problem List  Visit Diagnosis   Encounters  Notes  Medications  Labs  Result Review Imaging  Media: 23}    Prosper Hussein presents to [facility] for:    History of Present Illness  Management of her restrictive lung disease, pleural thickening and emphysema.  He is overweight.  He quit smoking in 1999.  He gets short of breath.  He takes Spiriva which helps.  He is requesting for us to send in the generic version to see if it is covered any cheaper.  He has Ventolin he can use when needed although he does not typically require it.  He has sleep apnea.  He is on Pap therapy.  He is compliant with this and benefiting from it.  Last office visit he was awaiting an appoint with cardiology for his blood pressure and an abnormal CT coronary scan.  He indicates everything checked out okay.  His only complaint today is cough which she relates to postnasal drip.  He was [place] last week and believes that may have aggravated his allergies.  He has not taken anything for it yet.       Prior to Admission medications    Medication Sig Start Date End Date Taking? Authorizing Provider   albuterol sulfate HFA (VENTOLIN HFA) 108 (90 Base) MCG/ACT inhaler Inhale 2 puffs Every 4 (Four) Hours As Needed for Wheezing. 12/21/18   Negrita Hernandez APRN   amLODIPine (NORVASC) 5 MG tablet 2 tablets. 8/26/19   Roslyn Arnold MD   aspirin 81 MG EC tablet Take 1 tablet by mouth Daily.    Roslyn Arnold MD   benzonatate (TESSALON) 200 MG capsule Take 1 capsule by mouth Every 12 (Twelve) Hours. 10/13/22   Negrita Hernandez APRN   carvedilol (COREG) 12.5 MG tablet TK 1 T PO QAM AND 2 TS QPM 8/26/19   Roslyn Arnold MD   D-5000 125 MCG (5000 UT) tablet Take 1 tablet by mouth Daily. 8/17/21   Roslyn Arnold MD   losartan-hydrochlorothiazide (HYZAAR) 100-12.5 MG per  "tablet TK 1 T PO ONCE A DAY GEF HYZAAR 19   Provider, MD Roslyn   metFORMIN (GLUCOPHAGE) 500 MG tablet TK 1 T PO  BID WC 19   Provider, MD Roslyn   Spiriva Respimat 2.5 MCG/ACT aerosol solution inhaler Inhale 2 puffs Daily for 30 days. 21  Negrita Hernandez APRN   tiotropium bromide monohydrate (Spiriva Respimat) 2.5 MCG/ACT aerosol solution inhaler Inhale 2 puffs Daily for 90 days. 23  Negrita Hernandez APRN       Social History     Socioeconomic History    Marital status:    Tobacco Use    Smoking status: Former     Packs/day: 1.00     Years: 20.00     Additional pack years: 0.00     Total pack years: 20.00     Types: Cigarettes     Quit date:      Years since quittin.9     Passive exposure: Past    Smokeless tobacco: Never   Substance and Sexual Activity    Alcohol use: Yes    Drug use: No    Sexual activity: Defer       Objective   Vital Signs:   /78   Pulse 67   Ht 177.8 cm (70\")   Wt 91.2 kg (201 lb)   SpO2 97% Comment: RA  BMI 28.84 kg/m²     Physical Exam  Constitutional:       Appearance: He is overweight.   Eyes:      Comments: Glasses   Cardiovascular:      Rate and Rhythm: Normal rate and regular rhythm.      Heart sounds: No murmur heard.  Pulmonary:      Effort: Pulmonary effort is normal.      Breath sounds: Normal breath sounds.      Comments: Cough with exam  Musculoskeletal:      Right lower leg: No edema.      Left lower leg: No edema.   Neurological:      Mental Status: He is alert and oriented to person, place, and time.        Result Review :    PFT Values          2022    09:15   Pre Drug PFT Results   FVC 66   FEV1 66   FEF 25-75% 63   FEV1/FVC 76   Other Tests PFT Results   DLCO 77   D/VAsb 110     My interpretation of the PFT : none    Results for orders placed in visit on 22    Pulmonary Function Test    Narrative  Pulmonary Function Test  Performed by: Ayla Orr, RRT  Authorized by: David, " KAROL Santamaria    Pre Drug % Predicted  FVC: 66%  FEV1: 66%  FEF 25-75%: 63%  FEV1/FVC: 76%  DLCO: 77%  D/VAsb: 110%    Interpretation  Spirometry  Spirometry shows moderate restriction. There is reduced midflow suggesting small airway/airflow obstruction.  Diffusion Capacity  The patient's diffusion capacity is mildly reduced.  Diffusion capacity is normal when corrected for alveolar volume.      Results for orders placed in visit on 11/02/21    Pulmonary Function Test    Narrative  Pulmonary Function Test  Performed by: Ayla Orr, RRT  Authorized by: Negrita Hernandez APRN    Pre Drug % Predicted  FVC: 75%  FEV1: 69%  FEF 25-75%: 48%  FEV1/FVC: 73.59%    Interpretation  Spirometry  Spirometry shows moderate restriction. There is reduced midflow suggesting small airway/airflow obstruction.      Results for orders placed in visit on 11/07/19    Pulmonary Function Test    Narrative  Pulmonary Function Test  Performed by: Negrita Hernandez APRN  Authorized by: Negrita Hernandez APRN    Pre Drug  FVC: 75%  FEV1: 73%  FEF 25-75%: 67%  FEV1/FVC: 77.61%    My interpretation of imaging:  none    My interpretation of labs: none      Assessment and Plan {CC Problem List  Visit Diagnosis  ROS  Review (Popup)  Health Maintenance  Quality  BestPractice  Medications  SmartSets  SnapShot Encounters  Media : 23}    Diagnoses and all orders for this visit:    1. Pleural thickening (Primary)  Comments:  Update PFTs when he returns.  Imaging as symptoms warrant    2. Centrilobular emphysema  Comments:  Update PFTs when he returns.  Imaging as symptoms warrant.  Continue to avoid smoking    3. Restrictive lung disease  Comments:  Continue Spiriva.  Refills under [facility]ia [facility]ropium sent to pharmacy.  Seldom requiring albuterol.  PFTs with follow-up  Orders:  -     tiotropium bromide monohydrate (SPIRIVA RESPIMAT) 2.5 MCG/ACT aerosol solution inhaler; Inhale 2 puffs Daily for 30 days.  Dispense: 4 g; Refill: 11    4.  Obstructive sleep apnea  Comments:  Continue PAP therapy.  He is compliant with this and benefiting from it    5. Personal history of nicotine dependence  Comments:  Continue to avoid smoking.  He does not meet criteria for low-dose lung cancer screening    6. Need for immunization against influenza  Comments:  Preventative maintenance provided.  Flu shot given.  No unwanted side effects noted while monitoring  Orders:  -     Fluzone High-Dose 65+yrs    7. Post-nasal drip  Comments:  Trial of azelastine.  If no better by Thursday he will call for prednisone  Orders:  -     azelastine (ASTEPRO) 0.15 % solution nasal spray; 1 spray into the nostril(s) as directed by provider 2 (Two) Times a Day for 30 days.  Dispense: 11 mL; Refill: 5        Body mass index is 28.84 kg/m².    Negrita Hernandez, KAROL  11/27/2023  09:22 CST    Follow Up   Return in about 6 months (around 5/27/2024) for FVL with diffusion.    Patient was given instructions and counseling regarding his condition or for health maintenance advice. Please see specific information pulled into the AVS if appropriate.

## 2023-11-27 ENCOUNTER — OFFICE VISIT (OUTPATIENT)
Dept: PULMONOLOGY | Facility: CLINIC | Age: 63
End: 2023-11-27
Payer: MEDICARE

## 2023-11-27 VITALS
SYSTOLIC BLOOD PRESSURE: 132 MMHG | HEIGHT: 70 IN | HEART RATE: 67 BPM | OXYGEN SATURATION: 97 % | BODY MASS INDEX: 28.77 KG/M2 | WEIGHT: 201 LBS | DIASTOLIC BLOOD PRESSURE: 78 MMHG

## 2023-11-27 DIAGNOSIS — Z87.891 PERSONAL HISTORY OF NICOTINE DEPENDENCE: Chronic | ICD-10-CM

## 2023-11-27 DIAGNOSIS — J43.2 CENTRILOBULAR EMPHYSEMA: Chronic | ICD-10-CM

## 2023-11-27 DIAGNOSIS — Z23 NEED FOR IMMUNIZATION AGAINST INFLUENZA: ICD-10-CM

## 2023-11-27 DIAGNOSIS — J98.4 RESTRICTIVE LUNG DISEASE: Chronic | ICD-10-CM

## 2023-11-27 DIAGNOSIS — J92.9 PLEURAL THICKENING: Primary | Chronic | ICD-10-CM

## 2023-11-27 DIAGNOSIS — R09.82 POST-NASAL DRIP: ICD-10-CM

## 2023-11-27 DIAGNOSIS — G47.33 OBSTRUCTIVE SLEEP APNEA: Chronic | ICD-10-CM

## 2023-11-27 PROCEDURE — 3075F SYST BP GE 130 - 139MM HG: CPT | Performed by: NURSE PRACTITIONER

## 2023-11-27 PROCEDURE — G0008 ADMIN INFLUENZA VIRUS VAC: HCPCS | Performed by: NURSE PRACTITIONER

## 2023-11-27 PROCEDURE — 3078F DIAST BP <80 MM HG: CPT | Performed by: NURSE PRACTITIONER

## 2023-11-27 PROCEDURE — 99214 OFFICE O/P EST MOD 30 MIN: CPT | Performed by: NURSE PRACTITIONER

## 2023-11-27 PROCEDURE — 1159F MED LIST DOCD IN RCRD: CPT | Performed by: NURSE PRACTITIONER

## 2023-11-27 PROCEDURE — 90662 IIV NO PRSV INCREASED AG IM: CPT | Performed by: NURSE PRACTITIONER

## 2023-11-27 PROCEDURE — 1160F RVW MEDS BY RX/DR IN RCRD: CPT | Performed by: NURSE PRACTITIONER

## 2023-11-27 RX ORDER — ROSUVASTATIN CALCIUM 20 MG/1
20 TABLET, COATED ORAL DAILY
COMMUNITY

## 2023-11-27 RX ORDER — AZELASTINE HCL 205.5 UG/1
1 SPRAY NASAL 2 TIMES DAILY
Qty: 11 ML | Refills: 5 | Status: SHIPPED | OUTPATIENT
Start: 2023-11-27 | End: 2023-12-27

## 2024-05-20 NOTE — PROGRESS NOTES
Chief Complaint  Pleural thickening    Subjective    History of Present Illness {  Problem List  Visit Diagnosis   Encounters  Notes  Medications  Labs  Result Review Imaging  Media: 23}    Prosper Hussein presents to [facility] for:    History of Present Illness  Management of her restrictive lung disease, pleural thickening and emphysema.  He is overweight.  He quit smoking in 1999.      He gets short of breath.  He takes Spiriva which helps.  He is needing refills.  He has Ventolin he can use when needed although he does not typically require it.      He has sleep apnea.  He is on Pap therapy.  He is compliant with this and benefiting from it.      If complaints of postnasal drip last office visit.  I prescribed him azelastine.  It did help.  He is only using it on an as-needed basis at this point.       Prior to Admission medications    Medication Sig Start Date End Date Taking? Authorizing Provider   albuterol sulfate HFA (VENTOLIN HFA) 108 (90 Base) MCG/ACT inhaler Inhale 2 puffs Every 4 (Four) Hours As Needed for Wheezing. 12/21/18   Negrita Hernandez APRN   amLODIPine (NORVASC) 5 MG tablet 2 tablets. 8/26/19   Roslyn Arnold MD   aspirin 81 MG EC tablet Take 1 tablet by mouth Daily.    Roslyn Arnold MD   benzonatate (TESSALON) 200 MG capsule Take 1 capsule by mouth Every 12 (Twelve) Hours. 10/13/22   Negrita Hernandez APRN   carvedilol (COREG) 12.5 MG tablet TK 1 T PO QAM AND 2 TS QPM 8/26/19   Roslyn Arnold MD   D-5000 125 MCG (5000 UT) tablet Take 1 tablet by mouth Daily. 8/17/21   Roslyn Arnold MD   losartan-hydrochlorothiazide (HYZAAR) 100-12.5 MG per tablet TK 1 T PO ONCE A DAY GEF HYZAAR 8/24/19   Roslyn Arnold MD   metFORMIN (GLUCOPHAGE) 500 MG tablet TK 1 T PO  BID WC 8/26/19   Roslyn Arnold MD   rosuvastatin (CRESTOR) 20 MG tablet Take 1 tablet by mouth Daily.    Roslyn Arnold MD  "  tiotropium bromide monohydrate (SPIRIVA RESPIMAT) 2.5 MCG/ACT aerosol solution inhaler Inhale 2 puffs Daily for 30 days. 23  Negrita Hernandez APRN       Social History     Socioeconomic History    Marital status:    Tobacco Use    Smoking status: Former     Current packs/day: 0.00     Average packs/day: 1 pack/day for 20.0 years (20.0 ttl pk-yrs)     Types: Cigarettes     Start date:      Quit date:      Years since quittin.4     Passive exposure: Past    Smokeless tobacco: Never   Substance and Sexual Activity    Alcohol use: Yes    Drug use: No    Sexual activity: Defer       Objective   Vital Signs:   /72   Pulse 72   Ht 177.8 cm (70\")   Wt 89.8 kg (198 lb)   SpO2 95% Comment: RA  BMI 28.41 kg/m²     Physical Exam  Constitutional:       Appearance: He is overweight.   Eyes:      Comments: Glasses   Cardiovascular:      Rate and Rhythm: Normal rate and regular rhythm.      Heart sounds: No murmur heard.  Pulmonary:      Effort: Pulmonary effort is normal.      Breath sounds: Normal breath sounds.   Musculoskeletal:      Right lower leg: No edema.      Left lower leg: No edema.   Neurological:      Mental Status: He is alert and oriented to person, place, and time.        Result Review :    PFT Values          2022    09:15 2024    11:00   Pre Drug PFT Results   FVC 66 67   FEV1 66 63   FEF 25-75% 63 53   FEV1/FVC 76 72   Other Tests PFT Results   DLCO 77 81   D/VAsb 110 116     Results for orders placed in visit on 24    Spirometry with Diffusion Capacity    Narrative  Spirometry with Diffusion Capacity    Performed by: Ayal Orr, RRT  Authorized by: Negrita Hernandez APRN  Pre Drug % Predicted  FVC: 67%  FEV1: 63%  FEF 25-75%: 53%  FEV1/FVC: 72%  DLCO: 81%  D/VAsb: 116%    Interpretation  Spirometry  Spirometry shows moderate restriction. There is reduced midflow suggesting small airway/airflow obstruction.  Diffusion Capacity  The " patient's diffusion capacity is normal.  Diffusion capacity is normal when corrected for alveolar volume.      Results for orders placed in visit on 11/14/22    Pulmonary Function Test    Narrative  Pulmonary Function Test  Performed by: Ayla Orr, THADDEUS  Authorized by: Negrita Hernandez APRN    Pre Drug % Predicted  FVC: 66%  FEV1: 66%  FEF 25-75%: 63%  FEV1/FVC: 76%  DLCO: 77%  D/VAsb: 110%    Interpretation  Spirometry  Spirometry shows moderate restriction. There is reduced midflow suggesting small airway/airflow obstruction.  Diffusion Capacity  The patient's diffusion capacity is mildly reduced.  Diffusion capacity is normal when corrected for alveolar volume.      Results for orders placed in visit on 11/02/21    Pulmonary Function Test    Narrative  Pulmonary Function Test  Performed by: Ayla Orr, THADDEUS  Authorized by: Negrita Hernandez APRN    Pre Drug % Predicted  FVC: 75%  FEV1: 69%  FEF 25-75%: 48%  FEV1/FVC: 73.59%    Interpretation  Spirometry  Spirometry shows moderate restriction. There is reduced midflow suggesting small airway/airflow obstruction.      My interpretation of imaging:  none    My interpretation of labs: none      Assessment and Plan {CC Problem List  Visit Diagnosis  ROS  Review (Popup)  Health Maintenance  Quality  BestPractice  Medications  SmartSets  SnapShot Encounters  Media : 23}    Diagnoses and all orders for this visit:    1. Pleural thickening (Primary)  Comments:  PFT stable.  Routine imaging not warranted  Orders:  -     tiotropium bromide monohydrate (SPIRIVA RESPIMAT) 2.5 MCG/ACT aerosol solution inhaler; Inhale 2 puffs Daily for 30 days.  Dispense: 4 g; Refill: 11    2. Centrilobular emphysema  Comments:  PFTs stable.  He quit smoking many years ago  Orders:  -     Spirometry with Diffusion Capacity    3. Restrictive lung disease  Comments:  PFT stable.  Refilled Spiriva.  Use albuterol as needed  Orders:  -     tiotropium bromide  monohydrate (SPIRIVA RESPIMAT) 2.5 MCG/ACT aerosol solution inhaler; Inhale 2 puffs Daily for 30 days.  Dispense: 4 g; Refill: 11    4. Obstructive sleep apnea  Comments:  Compliant with PAP therapy and benefiting from it.  Please continue    5. Personal history of nicotine dependence  Comments:  He quit smoking many years ago.  He does not qualify for low-dose lung cancer screening    6. Postnasal drip  Comments:  Symptoms resolved.  Resume azelastine if symptoms recur        Body mass index is 28.41 kg/m².    Negrita Hernandez, APRN  6/17/2024  11:29 CDT    Follow Up   Return in about 6 months (around 12/17/2024).    Patient was given instructions and counseling regarding his condition or for health maintenance advice. Please see specific information pulled into the AVS if appropriate.

## 2024-06-17 ENCOUNTER — PROCEDURE VISIT (OUTPATIENT)
Dept: PULMONOLOGY | Facility: CLINIC | Age: 64
End: 2024-06-17
Payer: MEDICARE

## 2024-06-17 ENCOUNTER — OFFICE VISIT (OUTPATIENT)
Dept: PULMONOLOGY | Facility: CLINIC | Age: 64
End: 2024-06-17
Payer: MEDICARE

## 2024-06-17 VITALS
SYSTOLIC BLOOD PRESSURE: 132 MMHG | OXYGEN SATURATION: 95 % | WEIGHT: 198 LBS | DIASTOLIC BLOOD PRESSURE: 72 MMHG | HEART RATE: 72 BPM | BODY MASS INDEX: 28.35 KG/M2 | HEIGHT: 70 IN

## 2024-06-17 DIAGNOSIS — J43.2 CENTRILOBULAR EMPHYSEMA: Chronic | ICD-10-CM

## 2024-06-17 DIAGNOSIS — Z87.891 PERSONAL HISTORY OF NICOTINE DEPENDENCE: Chronic | ICD-10-CM

## 2024-06-17 DIAGNOSIS — J92.9 PLEURAL THICKENING: Primary | Chronic | ICD-10-CM

## 2024-06-17 DIAGNOSIS — J43.2 CENTRILOBULAR EMPHYSEMA: Primary | ICD-10-CM

## 2024-06-17 DIAGNOSIS — J98.4 RESTRICTIVE LUNG DISEASE: Chronic | ICD-10-CM

## 2024-06-17 DIAGNOSIS — R09.82 POSTNASAL DRIP: ICD-10-CM

## 2024-06-17 DIAGNOSIS — G47.33 OBSTRUCTIVE SLEEP APNEA: Chronic | ICD-10-CM

## 2024-06-17 PROCEDURE — 94729 DIFFUSING CAPACITY: CPT | Performed by: NURSE PRACTITIONER

## 2024-06-17 PROCEDURE — 99214 OFFICE O/P EST MOD 30 MIN: CPT | Performed by: NURSE PRACTITIONER

## 2024-06-17 PROCEDURE — 3078F DIAST BP <80 MM HG: CPT | Performed by: NURSE PRACTITIONER

## 2024-06-17 PROCEDURE — 3075F SYST BP GE 130 - 139MM HG: CPT | Performed by: NURSE PRACTITIONER

## 2024-06-17 PROCEDURE — 1159F MED LIST DOCD IN RCRD: CPT | Performed by: NURSE PRACTITIONER

## 2024-06-17 PROCEDURE — 1160F RVW MEDS BY RX/DR IN RCRD: CPT | Performed by: NURSE PRACTITIONER

## 2024-06-17 PROCEDURE — 94375 RESPIRATORY FLOW VOLUME LOOP: CPT | Performed by: NURSE PRACTITIONER

## 2024-06-17 NOTE — PROCEDURES
Spirometry with Diffusion Capacity    Performed by: Ayla Orr, RRT  Authorized by: Negrita Hernandez APRN     Pre Drug % Predicted    FVC: 67%   FEV1: 63%   FEF 25-75%: 53%   FEV1/FVC: 72%   DLCO: 81%   D/VAsb: 116%    Interpretation   Spirometry   Spirometry shows moderate restriction. There is reduced midflow suggesting small airway/airflow obstruction.   Diffusion Capacity  The patient's diffusion capacity is normal.  Diffusion capacity is normal when corrected for alveolar volume.

## 2024-11-12 NOTE — PROGRESS NOTES
Chief Complaint  Pleural thickening and Restrictive lung disease    Subjective    History of Present Illness {CC  Problem List  Visit Diagnosis   Encounters  Notes  Medications  Labs  Result Review Imaging  Media: 23}    Prosper Hussein presents to [facility] for:    History of Present Illness  Management of her restrictive lung disease, pleural thickening and emphysema.  He is overweight.  He quit smoking in 1999.       He gets short of breath.  He takes Spiriva which helps.  He has Ventolin he can use when needed although he does not typically require it.  Spiriva refills were sent in today.     He has sleep apnea.  He is on Pap therapy.  He is compliant with this and benefiting from it.       He has had intermittent issues with postnasal drip in the past.  He has responded well to azelastine.         Prior to Admission medications    Medication Sig Start Date End Date Taking? Authorizing Provider   albuterol sulfate HFA (VENTOLIN HFA) 108 (90 Base) MCG/ACT inhaler Inhale 2 puffs Every 4 (Four) Hours As Needed for Wheezing.  Patient not taking: Reported on 6/17/2024 12/21/18   Negrita Hernandez APRN   amLODIPine (NORVASC) 5 MG tablet 2 tablets. 8/26/19   Roslyn Arnold MD   aspirin 81 MG EC tablet Take 1 tablet by mouth Daily.    Roslyn Arnold MD   benzonatate (TESSALON) 200 MG capsule Take 1 capsule by mouth Every 12 (Twelve) Hours. 10/13/22   Negrita Hernandez APRN   carvedilol (COREG) 12.5 MG tablet TK 1 T PO QAM AND 2 TS QPM 8/26/19   Roslyn Arnold MD   D-5000 125 MCG (5000 UT) tablet Take 1 tablet by mouth Daily. 8/17/21   Roslyn Arnold MD   losartan-hydrochlorothiazide (HYZAAR) 100-12.5 MG per tablet TK 1 T PO ONCE A DAY GEF HYZAAR 8/24/19   Roslyn Arnold MD   metFORMIN (GLUCOPHAGE) 500 MG tablet TK 1 T PO  BID WC 8/26/19   Roslyn Arnold MD   rosuvastatin (CRESTOR) 20 MG tablet Take 1 tablet by mouth  "Daily.    Provider, MD Roslyn   tiotropium bromide monohydrate (SPIRIVA RESPIMAT) 2.5 MCG/ACT aerosol solution inhaler Inhale 2 puffs Daily for 30 days. 24  Negrita Hernandez APRN       Social History     Socioeconomic History    Marital status:    Tobacco Use    Smoking status: Former     Current packs/day: 0.00     Average packs/day: 1 pack/day for 20.0 years (20.0 ttl pk-yrs)     Types: Cigarettes     Start date:      Quit date:      Years since quittin.9     Passive exposure: Past    Smokeless tobacco: Never   Vaping Use    Vaping status: Never Used   Substance and Sexual Activity    Alcohol use: Yes    Drug use: No    Sexual activity: Defer       Objective   Vital Signs:   /76   Pulse 78   Ht 177.8 cm (70\")   Wt 92 kg (202 lb 12.8 oz)   SpO2 95% Comment: ALESSIO  BMI 29.10 kg/m²     Physical Exam  Eyes:      Comments: glasses   Cardiovascular:      Rate and Rhythm: Normal rate and regular rhythm.      Heart sounds: No murmur heard.  Pulmonary:      Effort: Pulmonary effort is normal.      Breath sounds: Normal breath sounds.   Musculoskeletal:      Right lower leg: No edema.      Left lower leg: No edema.   Neurological:      Mental Status: He is alert and oriented to person, place, and time.        Result Review :    PFT Values          2024    11:00   Pre Drug PFT Results   FVC 67   FEV1 63   FEF 25-75% 53   FEV1/FVC 72   Other Tests PFT Results   DLCO 81   D/VAsb 116     My interpretation of the PFT : none    Results for orders placed in visit on 24    Spirometry with Diffusion Capacity    Narrative  Spirometry with Diffusion Capacity    Performed by: Ayla Orr, RRT  Authorized by: Negrita Hernandez APRN  Pre Drug % Predicted  FVC: 67%  FEV1: 63%  FEF 25-75%: 53%  FEV1/FVC: 72%  DLCO: 81%  D/VAsb: 116%    Interpretation  Spirometry  Spirometry shows moderate restriction. There is reduced midflow suggesting small airway/airflow " obstruction.  Diffusion Capacity  The patient's diffusion capacity is normal.  Diffusion capacity is normal when corrected for alveolar volume.      Results for orders placed in visit on 11/14/22    Pulmonary Function Test    Narrative  Pulmonary Function Test  Performed by: Ayla Orr RRT  Authorized by: Negrita Hernandez APRN    Pre Drug % Predicted  FVC: 66%  FEV1: 66%  FEF 25-75%: 63%  FEV1/FVC: 76%  DLCO: 77%  D/VAsb: 110%    Interpretation  Spirometry  Spirometry shows moderate restriction. There is reduced midflow suggesting small airway/airflow obstruction.  Diffusion Capacity  The patient's diffusion capacity is mildly reduced.  Diffusion capacity is normal when corrected for alveolar volume.      Results for orders placed in visit on 11/02/21    Pulmonary Function Test    Narrative  Pulmonary Function Test  Performed by: Ayla Orr RRT  Authorized by: Negrita Hernandez APRN    Pre Drug % Predicted  FVC: 75%  FEV1: 69%  FEF 25-75%: 48%  FEV1/FVC: 73.59%    Interpretation  Spirometry  Spirometry shows moderate restriction. There is reduced midflow suggesting small airway/airflow obstruction.      My interpretation of imaging:  none    My interpretation of labs: none      Assessment and Plan {CC Problem List  Visit Diagnosis  ROS  Review (Popup)  Health Maintenance  Quality  BestPractice  Medications  SmartSets  SnapShot Encounters  Media : 23}    Diagnoses and all orders for this visit:    1. Restrictive lung disease (Primary)  Comments:  Continue Spiriva.  Refill sent into the pharmacy via fax.  Use Ventolin as needed.  PFTs with follow-up    2. Pleural thickening  Comments:  Imaging as symptoms warrant.  Will obtain PFTs when he returns    3. Centrilobular emphysema  Comments:  Continue to avoid smoking.  PFTs with follow-up.  Encouraged RSV vaccination.  He is agreeable    4. Obstructive sleep apnea  Comments:  Continue PAP therapy.  He is compliant with this and  benefiting from it.        Body mass index is 29.1 kg/m².    Negrita Hernandez, APRN  12/17/2024  08:46 CST    Follow Up   Return in about 6 months (around 6/17/2025) for FVL with diffusion.    Patient was given instructions and counseling regarding his condition or for health maintenance advice. Please see specific information pulled into the AVS if appropriate.

## 2024-12-17 ENCOUNTER — OFFICE VISIT (OUTPATIENT)
Dept: PULMONOLOGY | Facility: CLINIC | Age: 64
End: 2024-12-17
Payer: MEDICARE

## 2024-12-17 VITALS
HEIGHT: 70 IN | SYSTOLIC BLOOD PRESSURE: 120 MMHG | OXYGEN SATURATION: 95 % | BODY MASS INDEX: 29.03 KG/M2 | HEART RATE: 78 BPM | DIASTOLIC BLOOD PRESSURE: 76 MMHG | WEIGHT: 202.8 LBS

## 2024-12-17 DIAGNOSIS — J98.4 RESTRICTIVE LUNG DISEASE: Chronic | ICD-10-CM

## 2024-12-17 DIAGNOSIS — J98.4 RESTRICTIVE LUNG DISEASE: Primary | Chronic | ICD-10-CM

## 2024-12-17 DIAGNOSIS — J92.9 PLEURAL THICKENING: Chronic | ICD-10-CM

## 2024-12-17 DIAGNOSIS — G47.33 OBSTRUCTIVE SLEEP APNEA: Chronic | ICD-10-CM

## 2024-12-17 DIAGNOSIS — J43.2 CENTRILOBULAR EMPHYSEMA: Chronic | ICD-10-CM

## 2024-12-17 RX ORDER — ASPIRIN 325 MG
325 TABLET, DELAYED RELEASE (ENTERIC COATED) ORAL DAILY
COMMUNITY

## 2024-12-17 NOTE — TELEPHONE ENCOUNTER
Rx Refill Note  Requested Prescriptions     Pending Prescriptions Disp Refills    tiotropium bromide monohydrate (SPIRIVA RESPIMAT) 2.5 MCG/ACT aerosol solution inhaler 4 g 11     Sig: Inhale 2 puffs Daily for 30 days.      Last office visit with prescribing clinician: 6/17/2024   Last telemedicine visit with prescribing clinician: Visit date not found   Next office visit with prescribing clinician: 12/17/2024                         Would you like a call back once the refill request has been completed: [] Yes [] No    If the office needs to give you a call back, can they leave a voicemail: [] Yes [] No    Ayla Glass MA  12/17/24, 08:12 CST

## 2025-06-05 NOTE — PROGRESS NOTES
Chief Complaint  Restrictive lung disease    Subjective    History of Present Illness {  Problem List  Visit Diagnosis   Encounters  Notes  Medications  Labs  Result Review Imaging  Media: 23}    Prosper Hussein presents to [facility] for:    History of Present Illness  Management of restrictive lung disease, pleural thickening and emphysema.  He is overweight.  He quit smoking in 1999.       He gets short of breath.  He takes Spiriva which helps.  He has Ventolin he can use when needed although he does not typically require it.  His activity level has increased since he retired.  This has helped his breathing.     He has sleep apnea.  He is on Pap therapy.  He is compliant with this and benefiting from it.       He has had intermittent issues with postnasal drip in the past.  He has responded well to azelastine.  He is having no issues with that currently.         Current Outpatient Medications:     amLODIPine (NORVASC) 5 MG tablet, 2 tablets., Disp: , Rfl: 2    aspirin 325 MG EC tablet, Take 1 tablet by mouth Daily., Disp: , Rfl:     Astepro 205.5 MCG/SPRAY solution nasal spray, Administer 1 spray into the nostril(s) as directed by provider 2 (Two) Times a Day., Disp: , Rfl:     benzonatate (TESSALON) 200 MG capsule, Take 1 capsule by mouth Every 12 (Twelve) Hours., Disp: 60 capsule, Rfl: 3    carvedilol (COREG) 12.5 MG tablet, TK 1 T PO QAM AND 2 TS QPM, Disp: , Rfl: 3    D-5000 125 MCG (5000 UT) tablet, Take 1 tablet by mouth Daily., Disp: , Rfl:     losartan-hydrochlorothiazide (HYZAAR) 100-12.5 MG per tablet, TK 1 T PO ONCE A DAY GEF HYZAAR, Disp: , Rfl: 0    metFORMIN (GLUCOPHAGE) 500 MG tablet, TK 1 T PO  BID WC, Disp: , Rfl: 3    rosuvastatin (CRESTOR) 20 MG tablet, Take 1 tablet by mouth Daily., Disp: , Rfl:     tiotropium bromide monohydrate (SPIRIVA RESPIMAT) 2.5 MCG/ACT aerosol solution inhaler, Inhale 2 puffs Daily for 30 days., Disp: 4 g,  "Rfl: 11    Social History     Socioeconomic History    Marital status:    Tobacco Use    Smoking status: Former     Current packs/day: 0.00     Average packs/day: 1 pack/day for 20.0 years (20.0 ttl pk-yrs)     Types: Cigarettes     Start date:      Quit date:      Years since quittin.4     Passive exposure: Past    Smokeless tobacco: Never   Vaping Use    Vaping status: Never Used   Substance and Sexual Activity    Alcohol use: Yes    Drug use: No    Sexual activity: Defer       Objective   Vital Signs:   /76   Pulse 78   Ht 175.3 cm (69\")   Wt 90.7 kg (200 lb)   SpO2 95% Comment: ALESSIO  BMI 29.53 kg/m²     Physical Exam  Eyes:      Comments: glasses   Cardiovascular:      Rate and Rhythm: Normal rate and regular rhythm.      Heart sounds: No murmur heard.  Pulmonary:      Effort: Pulmonary effort is normal.      Breath sounds: Normal breath sounds.   Musculoskeletal:      Right lower leg: No edema.      Left lower leg: No edema.   Neurological:      Mental Status: He is alert and oriented to person, place, and time.        Result Review :    PFT Values          2024    11:00 2025    08:45   Pre Drug PFT Results   FVC 67 77   FEV1 63 70   FEF 25-75% 53 55   FEV1/FVC 72 70   Other Tests PFT Results   DLCO 81 73   D/VAsb 116 102     Results for orders placed in visit on 25    Spirometry with Diffusion Capacity    Narrative  Spirometry with Diffusion Capacity    Performed by: Ayla Orr, RRT  Authorized by: Negrita Hernandez APRN  Pre Drug % Predicted  FVC: 77%  FEV1: 70%  FEF 25-75%: 55%  FEV1/FVC: 70%  DLCO: 73%  D/VAsb: 102%    Interpretation  Spirometry  Spirometry shows moderate. There is reduced midflow suggesting small airway/airflow obstruction.  Diffusion Capacity  The patient's diffusion capacity is mildly reduced.  Diffusion capacity is normal when corrected for alveolar volume.      Results for orders placed in visit on 24    Spirometry with " Diffusion Capacity    Narrative  Spirometry with Diffusion Capacity    Performed by: Ayla Orr, THADDEUS  Authorized by: Negrita Hernandez APRN  Pre Drug % Predicted  FVC: 67%  FEV1: 63%  FEF 25-75%: 53%  FEV1/FVC: 72%  DLCO: 81%  D/VAsb: 116%    Interpretation  Spirometry  Spirometry shows moderate restriction. There is reduced midflow suggesting small airway/airflow obstruction.  Diffusion Capacity  The patient's diffusion capacity is normal.  Diffusion capacity is normal when corrected for alveolar volume.      Results for orders placed in visit on 11/14/22    Pulmonary Function Test    Narrative  Pulmonary Function Test  Performed by: Ayla Orr, THADDEUS  Authorized by: Negrita Hernandez APRN    Pre Drug % Predicted  FVC: 66%  FEV1: 66%  FEF 25-75%: 63%  FEV1/FVC: 76%  DLCO: 77%  D/VAsb: 110%    Interpretation  Spirometry  Spirometry shows moderate restriction. There is reduced midflow suggesting small airway/airflow obstruction.  Diffusion Capacity  The patient's diffusion capacity is mildly reduced.  Diffusion capacity is normal when corrected for alveolar volume.      My interpretation of imaging:  none    My interpretation of labs: none      Assessment and Plan {CC Problem List  Visit Diagnosis  ROS  Review (Popup)  Health Maintenance  Quality  BestPractice  Medications  SmartSets  SnapShot Encounters  Media : 23}    Diagnoses and all orders for this visit:    1. Centrilobular emphysema (Primary)  Comments:  Routine imaging not warranted.  Continue to avoid smoking.  He quit in 1999.  PFT better  Orders:  -     Spirometry with Diffusion Capacity    2. Pleural thickening  Comments:  Routine imaging not warranted. Continue to avoid smoking. He quit in 1999. PFT better  Orders:  -     tiotropium bromide monohydrate (SPIRIVA RESPIMAT) 2.5 MCG/ACT aerosol solution inhaler; Inhale 2 puffs Daily for 30 days.  Dispense: 4 g; Refill: 11    3. Restrictive lung disease  Comments:  Continue  Spiriva.  Refill sent to pharmacy.  Use albuterol as needed.  PFTs better  Orders:  -     tiotropium bromide monohydrate (SPIRIVA RESPIMAT) 2.5 MCG/ACT aerosol solution inhaler; Inhale 2 puffs Daily for 30 days.  Dispense: 4 g; Refill: 11    4. Obstructive sleep apnea  Comments:  Continue PAP therapy.  He is compliant with this and benefiting from it    5. Personal history of nicotine dependence  Comments:  He quit in 1999.  Does not qualify for low-dose lung cancer screening      Prosper Hussein  reports that he quit smoking about 26 years ago. His smoking use included cigarettes. He started smoking about 46 years ago. He has a 20 pack-year smoking history. He has been exposed to tobacco smoke. He has never used smokeless tobacco.           Body mass index is 29.53 kg/m².    KAROL Horne  6/17/2025  09:04 CDT    Follow Up   Return in about 6 months (around 12/17/2025).    Patient was given instructions and counseling regarding his condition or for health maintenance advice. Please see specific information pulled into the AVS if appropriate.

## 2025-06-17 ENCOUNTER — OFFICE VISIT (OUTPATIENT)
Dept: PULMONOLOGY | Facility: CLINIC | Age: 65
End: 2025-06-17
Payer: MEDICARE

## 2025-06-17 ENCOUNTER — PROCEDURE VISIT (OUTPATIENT)
Dept: PULMONOLOGY | Facility: CLINIC | Age: 65
End: 2025-06-17
Payer: MEDICARE

## 2025-06-17 VITALS
BODY MASS INDEX: 29.62 KG/M2 | SYSTOLIC BLOOD PRESSURE: 128 MMHG | HEART RATE: 78 BPM | OXYGEN SATURATION: 95 % | HEIGHT: 69 IN | WEIGHT: 200 LBS | DIASTOLIC BLOOD PRESSURE: 76 MMHG

## 2025-06-17 DIAGNOSIS — Z87.891 PERSONAL HISTORY OF NICOTINE DEPENDENCE: Chronic | ICD-10-CM

## 2025-06-17 DIAGNOSIS — G47.33 OBSTRUCTIVE SLEEP APNEA: Chronic | ICD-10-CM

## 2025-06-17 DIAGNOSIS — J43.2 CENTRILOBULAR EMPHYSEMA: Primary | ICD-10-CM

## 2025-06-17 DIAGNOSIS — J98.4 RESTRICTIVE LUNG DISEASE: Primary | ICD-10-CM

## 2025-06-17 DIAGNOSIS — J98.4 RESTRICTIVE LUNG DISEASE: Chronic | ICD-10-CM

## 2025-06-17 DIAGNOSIS — J92.9 PLEURAL THICKENING: Chronic | ICD-10-CM

## 2025-06-17 PROCEDURE — 1159F MED LIST DOCD IN RCRD: CPT | Performed by: NURSE PRACTITIONER

## 2025-06-17 PROCEDURE — 94375 RESPIRATORY FLOW VOLUME LOOP: CPT | Performed by: NURSE PRACTITIONER

## 2025-06-17 PROCEDURE — 3074F SYST BP LT 130 MM HG: CPT | Performed by: NURSE PRACTITIONER

## 2025-06-17 PROCEDURE — 94729 DIFFUSING CAPACITY: CPT | Performed by: NURSE PRACTITIONER

## 2025-06-17 PROCEDURE — 1160F RVW MEDS BY RX/DR IN RCRD: CPT | Performed by: NURSE PRACTITIONER

## 2025-06-17 PROCEDURE — 99214 OFFICE O/P EST MOD 30 MIN: CPT | Performed by: NURSE PRACTITIONER

## 2025-06-17 PROCEDURE — 3078F DIAST BP <80 MM HG: CPT | Performed by: NURSE PRACTITIONER

## 2025-06-17 NOTE — PROCEDURES
Spirometry with Diffusion Capacity    Performed by: Ayla Orr, RRT  Authorized by: Negrita Hernandez APRN     Pre Drug % Predicted    FVC: 77%   FEV1: 70%   FEF 25-75%: 55%   FEV1/FVC: 70%   DLCO: 73%   D/VAsb: 102%    Interpretation   Spirometry   Spirometry shows moderate. There is reduced midflow suggesting small airway/airflow obstruction.   Diffusion Capacity  The patient's diffusion capacity is mildly reduced.  Diffusion capacity is normal when corrected for alveolar volume.